# Patient Record
Sex: FEMALE | HISPANIC OR LATINO | URBAN - METROPOLITAN AREA
[De-identification: names, ages, dates, MRNs, and addresses within clinical notes are randomized per-mention and may not be internally consistent; named-entity substitution may affect disease eponyms.]

---

## 2023-01-10 ENCOUNTER — APPOINTMENT (RX ONLY)
Dept: URBAN - METROPOLITAN AREA CLINIC 92 | Facility: CLINIC | Age: 28
Setting detail: DERMATOLOGY
End: 2023-01-10

## 2023-01-10 DIAGNOSIS — L91.0 HYPERTROPHIC SCAR: ICD-10-CM

## 2023-01-10 DIAGNOSIS — L81.1 CHLOASMA: ICD-10-CM | Status: IMPROVED

## 2023-01-10 PROCEDURE — 99214 OFFICE O/P EST MOD 30 MIN: CPT | Mod: 25

## 2023-01-10 PROCEDURE — ? INTRALESIONAL KENALOG

## 2023-01-10 PROCEDURE — ? PRODUCT LINE (OFFICE PRODUCTS)

## 2023-01-10 PROCEDURE — 11900 INJECT SKIN LESIONS </W 7: CPT

## 2023-01-10 PROCEDURE — ? PRESCRIPTION MEDICATION MANAGEMENT

## 2023-01-10 PROCEDURE — ? COUNSELING

## 2023-01-10 ASSESSMENT — LOCATION SIMPLE DESCRIPTION DERM
LOCATION SIMPLE: RIGHT CHEEK
LOCATION SIMPLE: LEFT CHEEK
LOCATION SIMPLE: LEFT BREAST

## 2023-01-10 ASSESSMENT — LOCATION ZONE DERM
LOCATION ZONE: FACE
LOCATION ZONE: TRUNK

## 2023-01-10 ASSESSMENT — LOCATION DETAILED DESCRIPTION DERM
LOCATION DETAILED: LEFT AREOLA
LOCATION DETAILED: RIGHT INFERIOR CENTRAL MALAR CHEEK
LOCATION DETAILED: LEFT INFERIOR CENTRAL MALAR CHEEK

## 2023-01-10 NOTE — PROCEDURE: INTRALESIONAL KENALOG
Ndc# For Kenalog Only: 5751-5810-78
Lot # (Optional): [id number]
How Many Mls Were Removed From The 80 Mg/Ml (5ml) Vial When Preparing The Injectable Solution?: 0
Concentration Of Solution Injected (Mg/Ml): 2.5
Show Inventory Tab: Hide
Include Z78.9 (Other Specified Conditions Influencing Health Status) As An Associated Diagnosis?: No
Detail Level: Detailed
Consent: The risks of atrophy were reviewed with the patient.
Total Volume Injected (Ccs- Only Use Numbers And Decimals): 0.2
Expiration Date (Optional): January 2024
Administered By (Optional): Lashonad Aaron
Medical Necessity Clause: This procedure was medically necessary because the lesions that were treated were:
Kenalog Preparation: Kenalog
Validate Note Data When Using Inventory: Yes

## 2023-01-10 NOTE — PROCEDURE: PRODUCT LINE (OFFICE PRODUCTS)
Product 4 Units: 1
Product 28 Price (In Dollars - Numeric Only, No Special Characters Or $): 0.00
Product 13 Units: 0
Send Charges To Patient Encounter: Yes
Product 4 Application Directions: Apply to body Daily or twice a day
Name Of Product 1: BLD Cream
Product 1 Price (In Dollars - Numeric Only, No Special Characters Or $): 90.00
Product 1 Application Directions: Apply to face QHS
Name Of Product 2: Vitamin C Serum
Product 2 Price (In Dollars - Numeric Only, No Special Characters Or $): 102.00
Product 2 Application Directions: Apply to face QAM
Detail Level: Zone
Name Of Product 3: Wederm ultra lite moisturizer
Assigning Risk Information: Per AMA, level of risk is based upon consequences of the problem(s) addressed at the encounter when appropriately treated. Risk also includes medical decision making related to the need to initiate or forego further testing, treatment and/or hospitalization. Over the counter medication are assigned a risk level of low. Prescription medication management is assigned a risk level of moderate.
Product 3 Price (In Dollars - Numeric Only, No Special Characters Or $): 20.50
Product 3 Application Directions: Apply to face BID
Risk Of Complication Category: No MDM
Name Of Product 4: Dream Skin RX
Product 4 Price (In Dollars - Numeric Only, No Special Characters Or $): 69.00

## 2023-01-10 NOTE — PROCEDURE: PRESCRIPTION MEDICATION MANAGEMENT
Initiate Treatment: Start BLD cream Apply to face QHS
Detail Level: Zone
Render In Strict Bullet Format?: No
Continue Regimen: Continue Vitamin C Serum QAM\\nContinue Suncreen \\nContinue Wederm ultra lite moisturizer \\nContinue Phyto QHS

## 2023-06-07 ENCOUNTER — APPOINTMENT (RX ONLY)
Dept: URBAN - METROPOLITAN AREA CLINIC 92 | Facility: CLINIC | Age: 28
Setting detail: DERMATOLOGY
End: 2023-06-07

## 2023-06-07 DIAGNOSIS — L91.0 HYPERTROPHIC SCAR: ICD-10-CM

## 2023-06-07 DIAGNOSIS — Z41.9 ENCOUNTER FOR PROCEDURE FOR PURPOSES OTHER THAN REMEDYING HEALTH STATE, UNSPECIFIED: ICD-10-CM

## 2023-06-07 PROCEDURE — ? INTRALESIONAL KENALOG

## 2023-06-07 PROCEDURE — ? COUNSELING

## 2023-06-07 PROCEDURE — ? COSMETIC CONSULTATION - MICRO-NEEDLING

## 2023-06-07 PROCEDURE — 11900 INJECT SKIN LESIONS </W 7: CPT

## 2023-06-07 ASSESSMENT — LOCATION ZONE DERM: LOCATION ZONE: TRUNK

## 2023-06-07 ASSESSMENT — LOCATION DETAILED DESCRIPTION DERM: LOCATION DETAILED: LEFT AREOLA

## 2023-06-07 ASSESSMENT — LOCATION SIMPLE DESCRIPTION DERM: LOCATION SIMPLE: LEFT BREAST

## 2023-06-07 NOTE — PROCEDURE: COSMETIC CONSULTATION - MICRO-NEEDLING
Detail Level: Detailed
Price (Use Numbers Only, No Special Characters Or $): [facility]

## 2023-06-07 NOTE — PROCEDURE: INTRALESIONAL KENALOG
Ndc# For Kenalog Only: 1701-6242-49
Lot # (Optional): [id number]
How Many Mls Were Removed From The 80 Mg/Ml (5ml) Vial When Preparing The Injectable Solution?: 0
Concentration Of Solution Injected (Mg/Ml): 2.5
Show Inventory Tab: Hide
Include Z78.9 (Other Specified Conditions Influencing Health Status) As An Associated Diagnosis?: No
Detail Level: Detailed
Consent: The risks of atrophy were reviewed with the patient.
Total Volume Injected (Ccs- Only Use Numbers And Decimals): 0.3
Treatment Number (Optional): 2
Expiration Date (Optional): jun 2024
Administered By (Optional): Adalid Martinez
Medical Necessity Clause: This procedure was medically necessary because the lesions that were treated were:
Kenalog Preparation: Kenalog
Validate Note Data When Using Inventory: Yes

## 2023-07-13 ENCOUNTER — APPOINTMENT (RX ONLY)
Dept: URBAN - METROPOLITAN AREA CLINIC 92 | Facility: CLINIC | Age: 28
Setting detail: DERMATOLOGY
End: 2023-07-13

## 2023-07-13 DIAGNOSIS — L91.0 HYPERTROPHIC SCAR: ICD-10-CM

## 2023-07-13 DIAGNOSIS — L81.0 POSTINFLAMMATORY HYPERPIGMENTATION: ICD-10-CM

## 2023-07-13 DIAGNOSIS — L81.1 CHLOASMA: ICD-10-CM

## 2023-07-13 PROCEDURE — ? PRESCRIPTION

## 2023-07-13 PROCEDURE — 99214 OFFICE O/P EST MOD 30 MIN: CPT | Mod: 25

## 2023-07-13 PROCEDURE — ? PRESCRIPTION MEDICATION MANAGEMENT

## 2023-07-13 PROCEDURE — ? COUNSELING

## 2023-07-13 PROCEDURE — 11900 INJECT SKIN LESIONS </W 7: CPT

## 2023-07-13 PROCEDURE — ? INTRALESIONAL KENALOG

## 2023-07-13 RX ORDER — TRIAMCINOLONE ACETONIDE 1 MG/G
CREAM TOPICAL
Qty: 30 | Refills: 2 | Status: ERX | COMMUNITY
Start: 2023-07-13

## 2023-07-13 RX ORDER — TAZAROTENE 1 MG/G
GEL TOPICAL
Qty: 30 | Refills: 1 | Status: ERX | COMMUNITY
Start: 2023-07-13

## 2023-07-13 RX ORDER — AZELAIC ACID 0.15 G/G
GEL TOPICAL NIGHTLY
Qty: 50 | Refills: 1 | Status: ERX | COMMUNITY
Start: 2023-07-13

## 2023-07-13 RX ADMIN — TRIAMCINOLONE ACETONIDE: 1 CREAM TOPICAL at 00:00

## 2023-07-13 RX ADMIN — TAZAROTENE: 1 GEL TOPICAL at 00:00

## 2023-07-13 RX ADMIN — AZELAIC ACID: 0.15 GEL TOPICAL at 00:00

## 2023-07-13 ASSESSMENT — LOCATION DETAILED DESCRIPTION DERM
LOCATION DETAILED: LEFT ANTERIOR PROXIMAL THIGH
LOCATION DETAILED: RIGHT INFERIOR CENTRAL MALAR CHEEK
LOCATION DETAILED: LEFT INFERIOR CENTRAL MALAR CHEEK
LOCATION DETAILED: RIGHT ANTERIOR PROXIMAL THIGH
LOCATION DETAILED: LEFT AREOLA

## 2023-07-13 ASSESSMENT — LOCATION ZONE DERM
LOCATION ZONE: FACE
LOCATION ZONE: TRUNK
LOCATION ZONE: LEG

## 2023-07-13 ASSESSMENT — LOCATION SIMPLE DESCRIPTION DERM
LOCATION SIMPLE: LEFT BREAST
LOCATION SIMPLE: RIGHT CHEEK
LOCATION SIMPLE: RIGHT THIGH
LOCATION SIMPLE: LEFT CHEEK
LOCATION SIMPLE: LEFT THIGH

## 2023-07-13 NOTE — PROCEDURE: INTRALESIONAL KENALOG
Ndc# For Kenalog Only: 6699-8708-52
Lot # (Optional): [id number]
How Many Mls Were Removed From The 80 Mg/Ml (5ml) Vial When Preparing The Injectable Solution?: 0
Concentration Of Solution Injected (Mg/Ml): 2.5
Show Inventory Tab: Hide
Include Z78.9 (Other Specified Conditions Influencing Health Status) As An Associated Diagnosis?: No
Detail Level: Detailed
Consent: The risks of atrophy were reviewed with the patient.
Total Volume Injected (Ccs- Only Use Numbers And Decimals): 0.2
Expiration Date (Optional): January 2024
Administered By (Optional): Jazz Dooley
Medical Necessity Clause: This procedure was medically necessary because the lesions that were treated were:
Kenalog Preparation: Kenalog
Validate Note Data When Using Inventory: Yes
Kenalog Type Of Vial: Multiple Dose

## 2023-09-12 ENCOUNTER — APPOINTMENT (RX ONLY)
Dept: URBAN - METROPOLITAN AREA CLINIC 92 | Facility: CLINIC | Age: 28
Setting detail: DERMATOLOGY
End: 2023-09-12

## 2023-09-12 DIAGNOSIS — L91.0 HYPERTROPHIC SCAR: ICD-10-CM

## 2023-09-12 DIAGNOSIS — D49.2 NEOPLASM OF UNSPECIFIED BEHAVIOR OF BONE, SOFT TISSUE, AND SKIN: ICD-10-CM

## 2023-09-12 DIAGNOSIS — L81.1 CHLOASMA: ICD-10-CM

## 2023-09-12 DIAGNOSIS — L81.0 POSTINFLAMMATORY HYPERPIGMENTATION: ICD-10-CM

## 2023-09-12 PROCEDURE — ? INTRALESIONAL KENALOG

## 2023-09-12 PROCEDURE — 99214 OFFICE O/P EST MOD 30 MIN: CPT | Mod: 25

## 2023-09-12 PROCEDURE — 11102 TANGNTL BX SKIN SINGLE LES: CPT

## 2023-09-12 PROCEDURE — ? PRESCRIPTION MEDICATION MANAGEMENT

## 2023-09-12 PROCEDURE — ? PRESCRIPTION

## 2023-09-12 PROCEDURE — ? COUNSELING

## 2023-09-12 PROCEDURE — ? BIOPSY BY SHAVE METHOD

## 2023-09-12 PROCEDURE — 11900 INJECT SKIN LESIONS </W 7: CPT

## 2023-09-12 RX ORDER — TRIAMCINOLONE ACETONIDE 1 MG/G
CREAM TOPICAL
Qty: 80 | Refills: 2 | Status: ERX

## 2023-09-12 ASSESSMENT — LOCATION SIMPLE DESCRIPTION DERM
LOCATION SIMPLE: RIGHT THIGH
LOCATION SIMPLE: LEFT UPPER ARM
LOCATION SIMPLE: RIGHT CHEEK
LOCATION SIMPLE: LEFT CHEEK
LOCATION SIMPLE: LEFT BREAST
LOCATION SIMPLE: LEFT THIGH

## 2023-09-12 ASSESSMENT — LOCATION DETAILED DESCRIPTION DERM
LOCATION DETAILED: LEFT ANTERIOR PROXIMAL THIGH
LOCATION DETAILED: RIGHT INFERIOR CENTRAL MALAR CHEEK
LOCATION DETAILED: LEFT INFERIOR CENTRAL MALAR CHEEK
LOCATION DETAILED: LEFT ANTERIOR DISTAL UPPER ARM
LOCATION DETAILED: LEFT AREOLA
LOCATION DETAILED: RIGHT ANTERIOR PROXIMAL THIGH

## 2023-09-12 ASSESSMENT — LOCATION ZONE DERM
LOCATION ZONE: TRUNK
LOCATION ZONE: ARM
LOCATION ZONE: LEG
LOCATION ZONE: FACE

## 2023-09-12 NOTE — PROCEDURE: INTRALESIONAL KENALOG
Ndc# For Kenalog Only: 7825-2752-16
Lot # (Optional): [id number]
How Many Mls Were Removed From The 80 Mg/Ml (5ml) Vial When Preparing The Injectable Solution?: 0
Concentration Of Solution Injected (Mg/Ml): 2.5
Show Inventory Tab: Hide
Include Z78.9 (Other Specified Conditions Influencing Health Status) As An Associated Diagnosis?: No
Detail Level: Detailed
Consent: The risks of atrophy were reviewed with the patient.
Total Volume Injected (Ccs- Only Use Numbers And Decimals): 0.2
Expiration Date (Optional): January 2024
Administered By (Optional): Jed Sahu
Medical Necessity Clause: This procedure was medically necessary because the lesions that were treated were:
Kenalog Preparation: Kenalog
Validate Note Data When Using Inventory: Yes
Kenalog Type Of Vial: Multiple Dose

## 2023-09-12 NOTE — PROCEDURE: PRESCRIPTION MEDICATION MANAGEMENT
Plan: Discussed chemical peel with Magda Alvarez in [place].
Detail Level: Zone
Render In Strict Bullet Format?: No
Continue Regimen: Vitamin C Serum QAM\\nSuncreen \\nWederm ultra lite moisturizer \\nBLD cream Apply to face QHS\\nAzelaic acid
Discontinue Regimen: Torazatene
Continue Regimen: triamcinolone acetonide 0.1 % topical cream - apply twice a day 2 weeks on 2 weeks off\\n(Discussed mixing BLD cream with TA 0.1% cream)

## 2023-09-12 NOTE — PROCEDURE: BIOPSY BY SHAVE METHOD

## 2024-05-01 ENCOUNTER — APPOINTMENT (RX ONLY)
Dept: URBAN - METROPOLITAN AREA CLINIC 92 | Facility: CLINIC | Age: 29
Setting detail: DERMATOLOGY
End: 2024-05-01

## 2024-05-01 DIAGNOSIS — L98.8 OTHER SPECIFIED DISORDERS OF THE SKIN AND SUBCUTANEOUS TISSUE: ICD-10-CM

## 2024-05-01 DIAGNOSIS — L81.1 CHLOASMA: ICD-10-CM | Status: IMPROVED

## 2024-05-01 PROBLEM — D23.62 OTHER BENIGN NEOPLASM OF SKIN OF LEFT UPPER LIMB, INCLUDING SHOULDER: Status: ACTIVE | Noted: 2024-05-01

## 2024-05-01 PROCEDURE — 99214 OFFICE O/P EST MOD 30 MIN: CPT

## 2024-05-01 PROCEDURE — ? COUNSELING

## 2024-05-01 PROCEDURE — ? PRESCRIPTION

## 2024-05-01 PROCEDURE — ? OTC TREATMENT REGIMEN

## 2024-05-01 PROCEDURE — ? PRESCRIPTION MEDICATION MANAGEMENT

## 2024-05-01 RX ORDER — TRETIONIN 1 MG/G
CREAM TOPICAL
Qty: 45 | Refills: 1 | Status: ERX | COMMUNITY
Start: 2024-05-01

## 2024-05-01 RX ORDER — AZELAIC ACID 0.15 G/G
GEL TOPICAL NIGHTLY
Qty: 50 | Refills: 1 | Status: ERX

## 2024-05-01 RX ADMIN — TRETIONIN: 1 CREAM TOPICAL at 00:00

## 2024-05-01 ASSESSMENT — LOCATION ZONE DERM: LOCATION ZONE: FACE

## 2024-05-01 ASSESSMENT — LOCATION DETAILED DESCRIPTION DERM
LOCATION DETAILED: LEFT INFERIOR CENTRAL MALAR CHEEK
LOCATION DETAILED: RIGHT INFERIOR CENTRAL MALAR CHEEK
LOCATION DETAILED: LEFT SUPERIOR CENTRAL MALAR CHEEK
LOCATION DETAILED: RIGHT SUPERIOR CENTRAL MALAR CHEEK

## 2024-05-01 ASSESSMENT — LOCATION SIMPLE DESCRIPTION DERM
LOCATION SIMPLE: RIGHT CHEEK
LOCATION SIMPLE: LEFT CHEEK

## 2024-05-01 NOTE — PROCEDURE: PRESCRIPTION MEDICATION MANAGEMENT
Plan: Discussed chemical peel with Irais in [place].
Initiate Treatment: azelaic acid 15 % topical gel Nightly: Apply 1gm to face at night\\n\\ntretinoin 0.1 % topical cream : Apply pea size amount to face Nightly with moisturizer mixed x 2 months
Detail Level: Zone
Render In Strict Bullet Format?: No
Discontinue Regimen: BLD cream Apply to face QHS(on hold)

## 2024-05-01 NOTE — PROCEDURE: OTC TREATMENT REGIMEN
Detail Level: Zone
Patient Specific Otc Recommendations (Will Not Stick From Patient To Patient): Fill in the lines
Patient Specific Otc Recommendations (Will Not Stick From Patient To Patient): Vitamin C Serum in the mornings \\n\\nSuncreen \\n\\nWederm ultra lite moisturizer

## 2024-07-24 ENCOUNTER — APPOINTMENT (RX ONLY)
Dept: URBAN - METROPOLITAN AREA CLINIC 92 | Facility: CLINIC | Age: 29
Setting detail: DERMATOLOGY
End: 2024-07-24

## 2024-07-24 DIAGNOSIS — L81.1 CHLOASMA: ICD-10-CM

## 2024-07-24 DIAGNOSIS — L98.8 OTHER SPECIFIED DISORDERS OF THE SKIN AND SUBCUTANEOUS TISSUE: ICD-10-CM

## 2024-07-24 DIAGNOSIS — L91.0 HYPERTROPHIC SCAR: ICD-10-CM

## 2024-07-24 PROBLEM — D23.62 OTHER BENIGN NEOPLASM OF SKIN OF LEFT UPPER LIMB, INCLUDING SHOULDER: Status: ACTIVE | Noted: 2024-07-24

## 2024-07-24 PROCEDURE — ? COUNSELING

## 2024-07-24 PROCEDURE — 99214 OFFICE O/P EST MOD 30 MIN: CPT | Mod: 25

## 2024-07-24 PROCEDURE — ? OTC TREATMENT REGIMEN

## 2024-07-24 PROCEDURE — 11900 INJECT SKIN LESIONS </W 7: CPT

## 2024-07-24 PROCEDURE — ? PRESCRIPTION MEDICATION MANAGEMENT

## 2024-07-24 PROCEDURE — ? INTRALESIONAL KENALOG

## 2024-07-24 ASSESSMENT — LOCATION DETAILED DESCRIPTION DERM
LOCATION DETAILED: LEFT AREOLA
LOCATION DETAILED: RIGHT SUPERIOR CENTRAL MALAR CHEEK
LOCATION DETAILED: LEFT INFERIOR CENTRAL MALAR CHEEK
LOCATION DETAILED: LEFT SUPERIOR CENTRAL MALAR CHEEK
LOCATION DETAILED: RIGHT INFERIOR CENTRAL MALAR CHEEK

## 2024-07-24 ASSESSMENT — LOCATION SIMPLE DESCRIPTION DERM
LOCATION SIMPLE: LEFT CHEEK
LOCATION SIMPLE: RIGHT CHEEK
LOCATION SIMPLE: LEFT BREAST

## 2024-07-24 ASSESSMENT — LOCATION ZONE DERM
LOCATION ZONE: TRUNK
LOCATION ZONE: FACE

## 2024-07-24 NOTE — PROCEDURE: OTC TREATMENT REGIMEN
Patient Specific Otc Recommendations (Will Not Stick From Patient To Patient): Vitamin C Serum in the mornings \\n\\nSuncreen \\n\\nWederm ultra lite moisturizer
Detail Level: Zone
Patient Specific Otc Recommendations (Will Not Stick From Patient To Patient): Fill in the lines

## 2024-07-24 NOTE — PROCEDURE: INTRALESIONAL KENALOG
Concentration Of Kenalog Solution Injected (Mg/Ml): 2.5
Show Inventory Tab: Hide
X Size Of Lesion In Cm (Optional): 0
Require Ndc Code?: No
Kenalog Preparation: Kenalog
Ndc# For Kenalog Only: [id number]
Detail Level: Simple
Medical Necessity Clause: This procedure was medically necessary because the lesions that were treated were:
Administered By (Optional): TAMICA ESPINOZA
Total Volume (Ccs): 0.6
Validate Note Data When Using Inventory: Yes
Expiration Date For Kenalog (Optional): 12/24
Consent: The risks of atrophy were reviewed with the patient.
Lot # For Kenalog (Optional): [id number]
Kenalog Type Of Vial: Multiple Dose

## 2024-07-24 NOTE — PROCEDURE: PRESCRIPTION MEDICATION MANAGEMENT
Samples Given: MelaB3
Plan: Discussed chemical peel with Irais in [place].
Detail Level: Zone
Render In Strict Bullet Format?: No
Continue Regimen: MelaB3 \\nazelaic acid 15 % topical gel Nightly: Apply 1gm to face at night\\ntretinoin 0.1 % topical cream : Apply pea size amount to face Nightly with moisturizer mixed x 2 months\\nBLD cream Apply to face QHS x3 months

## 2024-12-02 ENCOUNTER — APPOINTMENT (OUTPATIENT)
Dept: URBAN - METROPOLITAN AREA CLINIC 95 | Facility: CLINIC | Age: 29
Setting detail: DERMATOLOGY
End: 2024-12-02

## 2024-12-02 DIAGNOSIS — L81.1 CHLOASMA: ICD-10-CM | Status: INADEQUATELY CONTROLLED

## 2024-12-02 DIAGNOSIS — L65.9 NONSCARRING HAIR LOSS, UNSPECIFIED: ICD-10-CM

## 2024-12-02 PROCEDURE — ? ADDITIONAL NOTES

## 2024-12-02 PROCEDURE — ? PRESCRIPTION MEDICATION MANAGEMENT

## 2024-12-02 PROCEDURE — 99214 OFFICE O/P EST MOD 30 MIN: CPT

## 2024-12-02 PROCEDURE — ? COUNSELING

## 2024-12-02 PROCEDURE — ? OTC TREATMENT REGIMEN

## 2024-12-02 PROCEDURE — ? PRESCRIPTION

## 2024-12-02 RX ORDER — AZELAIC ACID 0.15 G/G
GEL TOPICAL QAM
Qty: 50 | Refills: 3 | Status: ERX

## 2024-12-02 ASSESSMENT — LOCATION DETAILED DESCRIPTION DERM
LOCATION DETAILED: RIGHT INFERIOR CENTRAL MALAR CHEEK
LOCATION DETAILED: RIGHT SUPERIOR PARIETAL SCALP
LOCATION DETAILED: LEFT INFERIOR CENTRAL MALAR CHEEK
LOCATION DETAILED: LEFT SUPERIOR PARIETAL SCALP

## 2024-12-02 ASSESSMENT — LOCATION SIMPLE DESCRIPTION DERM
LOCATION SIMPLE: SCALP
LOCATION SIMPLE: RIGHT CHEEK
LOCATION SIMPLE: LEFT CHEEK

## 2024-12-02 ASSESSMENT — LOCATION ZONE DERM
LOCATION ZONE: FACE
LOCATION ZONE: SCALP

## 2024-12-02 NOTE — PROCEDURE: ADDITIONAL NOTES
Additional Notes: Pt was recommended to see Irais for TCA peel.
Render Risk Assessment In Note?: no
Detail Level: Simple

## 2024-12-02 NOTE — PROCEDURE: OTC TREATMENT REGIMEN
Patient Specific Otc Recommendations (Will Not Stick From Patient To Patient): Rohit rhoades.
Detail Level: Zone
Patient Specific Otc Recommendations (Will Not Stick From Patient To Patient): Nutrafol supplements

## 2024-12-02 NOTE — PROCEDURE: PRESCRIPTION MEDICATION MANAGEMENT
Detail Level: Zone
Render In Strict Bullet Format?: No
Continue Regimen: Tarazotene 0.1% gel\\n\\nazelaic acid 15 % topical gel qam\\nApply to face in the morning as needed.
Discontinue Regimen: Stop BLD

## 2025-03-06 ENCOUNTER — APPOINTMENT (OUTPATIENT)
Dept: URBAN - METROPOLITAN AREA CLINIC 92 | Facility: CLINIC | Age: 30
Setting detail: DERMATOLOGY
End: 2025-03-06

## 2025-03-06 DIAGNOSIS — L81.1 CHLOASMA: ICD-10-CM | Status: WELL CONTROLLED

## 2025-03-06 PROCEDURE — ? ADDITIONAL NOTES

## 2025-03-06 PROCEDURE — ? OTC TREATMENT REGIMEN

## 2025-03-06 PROCEDURE — ? PRESCRIPTION MEDICATION MANAGEMENT

## 2025-03-06 PROCEDURE — ? COUNSELING

## 2025-03-06 PROCEDURE — 99214 OFFICE O/P EST MOD 30 MIN: CPT

## 2025-03-06 PROCEDURE — ? PRESCRIPTION

## 2025-03-06 RX ORDER — AZELAIC ACID 0.15 G/G
GEL TOPICAL QAM
Qty: 50 | Refills: 3 | Status: ERX

## 2025-03-06 ASSESSMENT — LOCATION SIMPLE DESCRIPTION DERM
LOCATION SIMPLE: LEFT CHEEK
LOCATION SIMPLE: RIGHT CHEEK

## 2025-03-06 ASSESSMENT — LOCATION DETAILED DESCRIPTION DERM
LOCATION DETAILED: LEFT INFERIOR CENTRAL MALAR CHEEK
LOCATION DETAILED: RIGHT INFERIOR CENTRAL MALAR CHEEK

## 2025-03-06 ASSESSMENT — LOCATION ZONE DERM: LOCATION ZONE: FACE

## 2025-03-06 NOTE — PROCEDURE: PRESCRIPTION MEDICATION MANAGEMENT
Detail Level: Zone
Render In Strict Bullet Format?: No
Modify Regimen: BLD: apply to affected areas on face QHS [id number]
Continue Regimen: azelaic acid 15 % topical gel qam\\nApply to face in the morning as needed.
Discontinue Regimen: Tarazotene 0.1% gel

## 2025-03-06 NOTE — PROCEDURE: OTC TREATMENT REGIMEN
Patient Specific Otc Recommendations (Will Not Stick From Patient To Patient): Rohit rhoades.
Detail Level: Zone

## 2025-03-06 NOTE — PROCEDURE: ADDITIONAL NOTES
Additional Notes: Pt was recommended to see Irais for TCA peel and BLD, continue for 3 months
Render Risk Assessment In Note?: no
Detail Level: Simple